# Patient Record
Sex: FEMALE | Race: WHITE | NOT HISPANIC OR LATINO | RURAL
[De-identification: names, ages, dates, MRNs, and addresses within clinical notes are randomized per-mention and may not be internally consistent; named-entity substitution may affect disease eponyms.]

---

## 2022-11-01 ENCOUNTER — HOSPITAL ENCOUNTER (EMERGENCY)
Facility: HOSPITAL | Age: 27
Discharge: HOME | End: 2022-11-01

## 2022-11-01 VITALS
HEART RATE: 92 BPM | RESPIRATION RATE: 20 BRPM | BODY MASS INDEX: 32.3 KG/M2 | TEMPERATURE: 98 F | OXYGEN SATURATION: 98 % | SYSTOLIC BLOOD PRESSURE: 128 MMHG | HEIGHT: 66 IN | WEIGHT: 201 LBS | DIASTOLIC BLOOD PRESSURE: 78 MMHG

## 2022-11-01 DIAGNOSIS — E86.0 DEHYDRATION: Primary | ICD-10-CM

## 2022-11-01 DIAGNOSIS — F19.20 DRUG ADDICTION: ICD-10-CM

## 2022-11-01 LAB
AMPHET UR QL SCN: POSITIVE
BARBITURATES UR QL SCN: NEGATIVE
BENZODIAZ METAB UR QL SCN: NEGATIVE
CANNABINOIDS UR QL SCN: POSITIVE
COCAINE UR QL SCN: NEGATIVE
OPIATES UR QL SCN: NEGATIVE
PCP UR QL SCN: NEGATIVE

## 2022-11-01 PROCEDURE — 99281 PR EMERGENCY DEPT VISIT,LEVEL I: ICD-10-PCS | Mod: ,,, | Performed by: PHYSICIAN ASSISTANT

## 2022-11-01 PROCEDURE — 80307 DRUG TEST PRSMV CHEM ANLYZR: CPT | Performed by: FAMILY MEDICINE

## 2022-11-01 PROCEDURE — 99281 EMR DPT VST MAYX REQ PHY/QHP: CPT | Mod: ,,, | Performed by: PHYSICIAN ASSISTANT

## 2022-11-01 PROCEDURE — 99283 EMERGENCY DEPT VISIT LOW MDM: CPT

## 2022-11-01 NOTE — DISCHARGE INSTRUCTIONS
Return to the ER if any new or worsening symptoms arise.  Follow up with Mental health and rehabilitation facility ASAP.

## 2022-11-01 NOTE — ED NOTES
Pt arrived per EMS saying she went for a walk around 2 yesterday and tripped and rolled down a hill and that she was lost pts pupils small and reactive @ 1mm pt noted with small scratches not bleeding on face and arms and legs bilaterally. VS wnl and pt saying she wants to go home. Warm blanket given. Pt alert and oriented. Pt denies any drug use and states she only drinks alcohol socially.

## 2022-11-01 NOTE — ED PROVIDER NOTES
"Encounter Date: 11/1/2022       History     Chief Complaint   Patient presents with    Cold Exposure     Pt lost in woods over 24 hrs after going for a "walk" and found with scratches all over her exts and oriented      HPI  Review of patient's allergies indicates:  No Known Allergies  No past medical history on file.  No past surgical history on file.  No family history on file.  Social History     Tobacco Use    Smoking status: Never   Substance Use Topics    Alcohol use: Yes    Drug use: Never     Review of Systems    Physical Exam     Initial Vitals [11/01/22 1654]   BP Pulse Resp Temp SpO2   118/72 101 20 97.7 °F (36.5 °C) 99 %      MAP       --         Physical Exam    Medical Screening Exam   See Full Note    ED Course   Procedures  Labs Reviewed   DRUG SCREEN, URINE (BEAKER) - Abnormal; Notable for the following components:       Result Value    Amphetamine Positive (*)     Cannabinoid, Urine Positive (*)     All other components within normal limits    Narrative:     The results of screening tests should be considered presumptive. Confirmatory testing is available upon request.    Cutoff Points:  PCP:         25ng/mL  AMPH:        500ng/mL  SARA:        200ng/mL  PABLO:        200ng/mL  THC:         50ng/mL  ROD:         300ng/mL  OPI:         2000ng/mL          Imaging Results    None          Medications - No data to display              ED Course as of 11/01/22 1815 Tue Nov 01, 2022 1810 Dr. Flores instucted to discharge patient.  She will report to rehab with her Father. [CB]      ED Course User Index  [CB] GARFIELD Iyer          Clinical Impression:   Final diagnoses:  [E86.0] Dehydration (Primary)  [F19.20] Drug addiction        ED Disposition Condition    Discharge Stable          ED Prescriptions    None       Follow-up Information    None          GARFIELD Iyer  11/01/22 1815    "

## 2024-08-19 ENCOUNTER — OFFICE VISIT (OUTPATIENT)
Dept: OBSTETRICS AND GYNECOLOGY | Facility: CLINIC | Age: 29
End: 2024-08-19
Payer: COMMERCIAL

## 2024-08-19 VITALS
WEIGHT: 230 LBS | RESPIRATION RATE: 17 BRPM | SYSTOLIC BLOOD PRESSURE: 110 MMHG | DIASTOLIC BLOOD PRESSURE: 78 MMHG | HEIGHT: 66 IN | BODY MASS INDEX: 36.96 KG/M2 | HEART RATE: 66 BPM | OXYGEN SATURATION: 99 %

## 2024-08-19 DIAGNOSIS — Z30.432 ENCOUNTER FOR IUD REMOVAL: Primary | ICD-10-CM

## 2024-08-19 DIAGNOSIS — L91.8 SKIN TAG: ICD-10-CM

## 2024-08-19 PROCEDURE — 99499 UNLISTED E&M SERVICE: CPT | Mod: ,,, | Performed by: ADVANCED PRACTICE MIDWIFE

## 2024-08-19 PROCEDURE — 88305 TISSUE EXAM BY PATHOLOGIST: CPT | Mod: 26,,, | Performed by: PATHOLOGY

## 2024-08-19 PROCEDURE — 88305 TISSUE EXAM BY PATHOLOGIST: CPT | Mod: TC,SUR | Performed by: ADVANCED PRACTICE MIDWIFE

## 2024-08-19 PROCEDURE — 58301 REMOVE INTRAUTERINE DEVICE: CPT | Mod: ,,, | Performed by: ADVANCED PRACTICE MIDWIFE

## 2024-08-19 RX ORDER — OXCARBAZEPINE 300 MG/1
300 TABLET, FILM COATED ORAL 2 TIMES DAILY
COMMUNITY
Start: 2024-06-24

## 2024-08-19 NOTE — PROCEDURES
Removal of IUD    Date/Time: 8/19/2024 1:00 PM    Performed by: Di Ornelas CNM  Authorized by: Di Ornelas CNM    Consent obtained:  Prior to procedure the appropriate consent was completed and verified  Consent given by:  Patient  Procedure risks and benefits discussed: yes    Patient questions answered: yes    Patient agrees, verbalizes understanding, and wants to proceed: yes    Educational handouts given: yes    Instructions and paperwork completed: yes    Implant grasped by: forceps and ring forceps  Removal due to infection and inflammatory reaction: no    Removal due to mechanical complications: no    Removed with no complications: yes     Small tissue fragment attached to IUD string. Specimen sent to pathology. Tolerated procedure well. Encouraged to schedule annual exam with Pap. Minimal blood loss noted.   Removal due to expiration: yes

## 2024-08-21 LAB
ESTROGEN SERPL-MCNC: NORMAL PG/ML
INSULIN SERPL-ACNC: NORMAL U[IU]/ML
LAB AP CLINICAL INFORMATION: NORMAL
LAB AP GROSS DESCRIPTION: NORMAL
LAB AP LABORATORY NOTES: NORMAL
T3RU NFR SERPL: NORMAL %

## 2024-08-22 ENCOUNTER — TELEPHONE (OUTPATIENT)
Dept: OBSTETRICS AND GYNECOLOGY | Facility: CLINIC | Age: 29
End: 2024-08-22
Payer: COMMERCIAL

## 2024-08-22 NOTE — TELEPHONE ENCOUNTER
----- Message from Di Ornelas CNM sent at 8/22/2024 10:58 AM CDT -----  Notify of path report. Tissue fragment attached to IUD string negative for abnormal cells.